# Patient Record
Sex: MALE | Race: WHITE
[De-identification: names, ages, dates, MRNs, and addresses within clinical notes are randomized per-mention and may not be internally consistent; named-entity substitution may affect disease eponyms.]

---

## 2020-08-28 ENCOUNTER — HOSPITAL ENCOUNTER (EMERGENCY)
Dept: HOSPITAL 47 - EC | Age: 60
Discharge: HOME | End: 2020-08-28
Payer: COMMERCIAL

## 2020-08-28 VITALS
HEART RATE: 111 BPM | TEMPERATURE: 98.4 F | SYSTOLIC BLOOD PRESSURE: 133 MMHG | DIASTOLIC BLOOD PRESSURE: 90 MMHG | RESPIRATION RATE: 18 BRPM

## 2020-08-28 DIAGNOSIS — L03.116: Primary | ICD-10-CM

## 2020-08-28 DIAGNOSIS — Z88.0: ICD-10-CM

## 2020-08-28 DIAGNOSIS — F17.200: ICD-10-CM

## 2020-08-28 PROCEDURE — 99283 EMERGENCY DEPT VISIT LOW MDM: CPT

## 2020-08-28 NOTE — ED
Skin/Abscess/FB HPI





- General


Chief complaint: Wound/Laceration


Stated complaint: Left leg wound


Time Seen by Provider: 08/28/20 02:23


Source: patient, RN notes reviewed, old records reviewed


Mode of arrival: ambulatory


Limitations: no limitations





- History of Present Illness


Initial comments: 





This is a 59-year-old male DF for evaluation of significant redness and pain of 

his left thigh area.  Patient is no injury noted to that area he has some 

drainage from it initial area originally, his been cleaned washed


With the redness has been spreading.  No fevers.  Patient has not been on 

antibiotics for symptoms.  No other noted areas of rash or illness


MD complaint: rash


-: days(s)


Tetanus Up to Date: unsure


Location: LLE


Severity: moderate


Severity scale (1-10): 5


Quality: burning (And itching)


Consistency: constant


Improves with: none


Worsens with: none


Context: none


Associated symptoms: denies other symptoms





- Related Data


                                  Previous Rx's











 Medication  Instructions  Recorded


 


Sulfamethox-Tmp 800-160Mg [Bactrim 2 tab PO BID #40 tab 08/28/20





-160 mg]  











                                    Allergies











Allergy/AdvReac Type Severity Reaction Status Date / Time


 


Penicillins Allergy  Rash/Hives Verified 08/28/20 02:23














Review of Systems


ROS Statement: 


Those systems with pertinent positive or pertinent negative responses have been 

documented in the HPI.





ROS Other: All systems not noted in ROS Statement are negative.





Past Medical History


Past Medical History: No Reported History


History of Any Multi-Drug Resistant Organisms: None Reported


Past Surgical History: No Surgical Hx Reported


Past Psychological History: No Psychological Hx Reported


Smoking Status: Current every day smoker


Past Alcohol Use History: None Reported


Past Drug Use History: None Reported





General Exam


Limitations: no limitations


General appearance: alert, in no apparent distress


Head exam: Present: atraumatic, normocephalic, normal inspection


Eye exam: Present: normal appearance, PERRL, EOMI.  Absent: scleral icterus, 

conjunctival injection, periorbital swelling


ENT exam: Present: normal exam, mucous membranes moist


Neck exam: Present: normal inspection.  Absent: tenderness, meningismus, lympha

denopathy


Respiratory exam: Present: normal lung sounds bilaterally.  Absent: respiratory 

distress, wheezes, rales, rhonchi, stridor


Cardiovascular Exam: Present: regular rate, normal rhythm, normal heart sounds. 

Absent: systolic murmur, diastolic murmur, rubs, gallop, clicks


GI/Abdominal exam: Present: soft, normal bowel sounds.  Absent: distended, 

tenderness, guarding, rebound, rigid


Extremities exam: Present: normal inspection, full ROM, normal capillary refill,

other (Left lower extremity a significant area of rash circular with a diameter 

of 7 cm).  Absent: tenderness, pedal edema, joint swelling, calf tenderness


Back exam: Present: normal inspection


Neurological exam: Present: alert, oriented X3, CN II-XII intact


Psychiatric exam: Present: normal affect, normal mood


Skin exam: Present: warm, dry, intact, normal color.  Absent: rash





Course


                                   Vital Signs











  08/28/20





  02:19


 


Temperature 98.4 F


 


Pulse Rate 111 H


 


Respiratory 18





Rate 


 


Blood Pressure 133/90


 


O2 Sat by Pulse 100





Oximetry 














- Reevaluation(s)


Reevaluation #1: 





Medical records reviewed





Spoke with patient need for antibiotics as well as topical antibiotics a 60 

okay for discharge will return if symptoms worsen or fever





Medical Decision Making





- Medical Decision Making





59 male DL without functionally cellulitis will trial outpatient antibiotics and

topical antibiotics return if symptoms worsen or fever





Disposition


Clinical Impression: 


 Left leg cellulitis





Disposition: HOME SELF-CARE


Condition: Good


Instructions (If sedation given, give patient instructions):  Cellulitis (ED)


Prescriptions: 


Sulfamethox-Tmp 800-160Mg [Bactrim -160 mg] 2 tab PO BID #40 tab


Is patient prescribed a controlled substance at d/c from ED?: No


Referrals: 


None,Stated [Primary Care Provider] - 1-2 days

## 2021-09-05 ENCOUNTER — HOSPITAL ENCOUNTER (INPATIENT)
Dept: HOSPITAL 47 - EC | Age: 61
LOS: 3 days | Discharge: HOME | DRG: 199 | End: 2021-09-08
Attending: INTERNAL MEDICINE | Admitting: INTERNAL MEDICINE
Payer: COMMERCIAL

## 2021-09-05 VITALS — BODY MASS INDEX: 15.7 KG/M2

## 2021-09-05 DIAGNOSIS — F12.90: ICD-10-CM

## 2021-09-05 DIAGNOSIS — J43.9: ICD-10-CM

## 2021-09-05 DIAGNOSIS — Z88.0: ICD-10-CM

## 2021-09-05 DIAGNOSIS — F17.210: ICD-10-CM

## 2021-09-05 DIAGNOSIS — Z71.6: ICD-10-CM

## 2021-09-05 DIAGNOSIS — Z20.822: ICD-10-CM

## 2021-09-05 DIAGNOSIS — E86.9: ICD-10-CM

## 2021-09-05 DIAGNOSIS — Z59.7: ICD-10-CM

## 2021-09-05 DIAGNOSIS — E86.0: ICD-10-CM

## 2021-09-05 DIAGNOSIS — J96.21: ICD-10-CM

## 2021-09-05 DIAGNOSIS — E87.2: ICD-10-CM

## 2021-09-05 DIAGNOSIS — J93.11: Primary | ICD-10-CM

## 2021-09-05 LAB
ALBUMIN SERPL-MCNC: 4.4 G/DL (ref 3.5–5)
ALP SERPL-CCNC: 75 U/L (ref 38–126)
ALT SERPL-CCNC: 22 U/L (ref 4–49)
ANION GAP SERPL CALC-SCNC: 9 MMOL/L
APTT BLD: 23 SEC (ref 22–30)
AST SERPL-CCNC: 28 U/L (ref 17–59)
BASOPHILS # BLD AUTO: 0.1 K/UL (ref 0–0.2)
BASOPHILS NFR BLD AUTO: 1 %
BUN SERPL-SCNC: 13 MG/DL (ref 9–20)
CALCIUM SPEC-MCNC: 9.6 MG/DL (ref 8.4–10.2)
CHLORIDE SERPL-SCNC: 107 MMOL/L (ref 98–107)
CO2 SERPL-SCNC: 24 MMOL/L (ref 22–30)
EOSINOPHIL # BLD AUTO: 0.2 K/UL (ref 0–0.7)
EOSINOPHIL NFR BLD AUTO: 2 %
ERYTHROCYTE [DISTWIDTH] IN BLOOD BY AUTOMATED COUNT: 4.67 M/UL (ref 4.3–5.9)
ERYTHROCYTE [DISTWIDTH] IN BLOOD: 14 % (ref 11.5–15.5)
GLUCOSE SERPL-MCNC: 144 MG/DL (ref 74–99)
HCT VFR BLD AUTO: 47 % (ref 39–53)
HGB BLD-MCNC: 15.6 GM/DL (ref 13–17.5)
INR PPP: 1 (ref ?–1.2)
LYMPHOCYTES # SPEC AUTO: 1.5 K/UL (ref 1–4.8)
LYMPHOCYTES NFR SPEC AUTO: 15 %
MAGNESIUM SPEC-SCNC: 2 MG/DL (ref 1.6–2.3)
MCH RBC QN AUTO: 33.4 PG (ref 25–35)
MCHC RBC AUTO-ENTMCNC: 33.2 G/DL (ref 31–37)
MCV RBC AUTO: 100.6 FL (ref 80–100)
MONOCYTES # BLD AUTO: 0.5 K/UL (ref 0–1)
MONOCYTES NFR BLD AUTO: 5 %
NEUTROPHILS # BLD AUTO: 7.8 K/UL (ref 1.3–7.7)
NEUTROPHILS NFR BLD AUTO: 75 %
PLATELET # BLD AUTO: 319 K/UL (ref 150–450)
POTASSIUM SERPL-SCNC: 4.5 MMOL/L (ref 3.5–5.1)
PROT SERPL-MCNC: 7 G/DL (ref 6.3–8.2)
PT BLD: 10.3 SEC (ref 9–12)
SODIUM SERPL-SCNC: 140 MMOL/L (ref 137–145)
WBC # BLD AUTO: 10.3 K/UL (ref 3.8–10.6)

## 2021-09-05 PROCEDURE — 80053 COMPREHEN METABOLIC PANEL: CPT

## 2021-09-05 PROCEDURE — 3E0F7SF INTRODUCTION OF OTHER GAS INTO RESPIRATORY TRACT, VIA NATURAL OR ARTIFICIAL OPENING: ICD-10-PCS

## 2021-09-05 PROCEDURE — 87635 SARS-COV-2 COVID-19 AMP PRB: CPT

## 2021-09-05 PROCEDURE — 93005 ELECTROCARDIOGRAM TRACING: CPT

## 2021-09-05 PROCEDURE — 32551 INSERTION OF CHEST TUBE: CPT

## 2021-09-05 PROCEDURE — 99291 CRITICAL CARE FIRST HOUR: CPT

## 2021-09-05 PROCEDURE — 80048 BASIC METABOLIC PNL TOTAL CA: CPT

## 2021-09-05 PROCEDURE — 71250 CT THORAX DX C-: CPT

## 2021-09-05 PROCEDURE — 36415 COLL VENOUS BLD VENIPUNCTURE: CPT

## 2021-09-05 PROCEDURE — 85379 FIBRIN DEGRADATION QUANT: CPT

## 2021-09-05 PROCEDURE — 85610 PROTHROMBIN TIME: CPT

## 2021-09-05 PROCEDURE — 84145 PROCALCITONIN (PCT): CPT

## 2021-09-05 PROCEDURE — 85027 COMPLETE CBC AUTOMATED: CPT

## 2021-09-05 PROCEDURE — 83735 ASSAY OF MAGNESIUM: CPT

## 2021-09-05 PROCEDURE — 0W9930Z DRAINAGE OF RIGHT PLEURAL CAVITY WITH DRAINAGE DEVICE, PERCUTANEOUS APPROACH: ICD-10-PCS

## 2021-09-05 PROCEDURE — 71045 X-RAY EXAM CHEST 1 VIEW: CPT

## 2021-09-05 PROCEDURE — 85025 COMPLETE CBC W/AUTO DIFF WBC: CPT

## 2021-09-05 PROCEDURE — 83605 ASSAY OF LACTIC ACID: CPT

## 2021-09-05 PROCEDURE — 71046 X-RAY EXAM CHEST 2 VIEWS: CPT

## 2021-09-05 PROCEDURE — 81003 URINALYSIS AUTO W/O SCOPE: CPT

## 2021-09-05 PROCEDURE — 85730 THROMBOPLASTIN TIME PARTIAL: CPT

## 2021-09-05 PROCEDURE — 84484 ASSAY OF TROPONIN QUANT: CPT

## 2021-09-05 PROCEDURE — 83880 ASSAY OF NATRIURETIC PEPTIDE: CPT

## 2021-09-05 RX ADMIN — CEFAZOLIN SCH MLS/HR: 330 INJECTION, POWDER, FOR SOLUTION INTRAMUSCULAR; INTRAVENOUS at 20:46

## 2021-09-05 RX ADMIN — ACETAMINOPHEN PRN MG: 325 TABLET, FILM COATED ORAL at 22:09

## 2021-09-05 SDOH — ECONOMIC STABILITY - INCOME SECURITY: INSUFFICIENT SOCIAL INSURANCE AND WELFARE SUPPORT: Z59.7

## 2021-09-05 NOTE — ED
SOB HPI





- General


Source: patient, EMS, RN notes reviewed


Mode of arrival: EMS





<Doug Keane - Last Filed: 09/05/21 19:52>





<Mohinder Quintero - Last Filed: 09/05/21 20:02>





- General


Chief Complaint: Shortness of Breath


Stated Complaint: JANNY


Time Seen by Provider: 09/05/21 17:54





- History of Present Illness


Initial Comments: 





Patient is a 60-year-old male that presents to emergency department complaining 

of a one to two-week history of shortness of breath.  He notes that he is a 

long-time smoker.  He notes that he was outside he doing yardwork one when he 

came into the cool air and noticed that his symptoms started.  The injury or 

trauma to his chest or ribs.  Patient did appear to be feeling under the 

weather.  She denied any history of lung complications during issues but thinks 

that he might of developed some with the last 2 weeks.  He denied any chest pain

headache nausea vomiting diarrhea constipation fever fatigue chills. 

(Doug Keane)





- Related Data


                                Home Medications











 Medication  Instructions  Recorded  Confirmed


 


No Known Home Medications  09/05/21 09/05/21











                                    Allergies











Allergy/AdvReac Type Severity Reaction Status Date / Time


 


Penicillins Allergy  Rash/Hives Verified 09/05/21 18:55














Review of Systems


ROS Other: All systems not noted in ROS Statement are negative.





<Doug Keane - Last Filed: 09/05/21 19:52>


ROS Other: All systems not noted in ROS Statement are negative.





<Mohinder Quintero - Last Filed: 09/05/21 20:02>


ROS Statement: 


Those systems with pertinent positive or pertinent negative responses have been 

documented in the HPI.








Past Medical History


Past Medical History: No Reported History


History of Any Multi-Drug Resistant Organisms: None Reported


Past Surgical History: No Surgical Hx Reported


Past Psychological History: No Psychological Hx Reported


Smoking Status: Current every day smoker


Past Alcohol Use History: None Reported


Past Drug Use History: None Reported





<Doug Keane - Last Filed: 09/05/21 19:52>





General Exam


General appearance: alert, in no apparent distress, other (Malnourished 

underweight)


Head exam: Present: atraumatic, normocephalic, normal inspection


Eye exam: Present: normal appearance, PERRL, EOMI.  Absent: scleral icterus, 

conjunctival injection, periorbital swelling


Neck exam: Present: normal inspection


Respiratory exam: Present: decreased breath sounds (Right lower lobe right 

middle lobe).  Absent: respiratory distress, wheezes, rales, rhonchi, stridor


Cardiovascular Exam: Present: regular rate, normal rhythm, normal heart sounds. 

Absent: systolic murmur, diastolic murmur, rubs, gallop, clicks


Extremities exam: Present: normal inspection, full ROM, normal capillary refill.

 Absent: tenderness, pedal edema, joint swelling, calf tenderness


Neurological exam: Present: alert, oriented X3


Psychiatric exam: Present: normal affect, normal mood


Skin exam: Present: warm, dry, intact, normal color.  Absent: rash





<Doug Keane - Last Filed: 09/05/21 19:52>





Course





                                   Vital Signs











  09/05/21 09/05/21 09/05/21





  17:42 18:48 19:47


 


Temperature 97.6 F  


 


Pulse Rate 116 H 114 H 116 H


 


Respiratory 25 H 20 20





Rate   


 


Blood Pressure 145/94 113/92 124/93


 


O2 Sat by Pulse 97  96





Oximetry   














Procedures





- Chest Tube Insertion


Consent Obtained: written consent


Side of Procedure: right


Indication: Pneumothorax


Placed on monitor/pulse oximetry: Yes


Site Prep: Chloroprep


Local Anesthesia: Lidocaine 1%


Amount (mLs): 10


Insertion Site: Other (Midclavicular line, second intercostal space.)


Scalpel: #11


Open into Pleural Space Using: Trocar


Tube Size ([place]): Other (11-[place])


Returns: Air


Sutured in Place: No


Attached to Suction: Yes


Type of Suction: Pleuravac


Repeat X-ray Results: Lung Inflated


Patient Tolerated Procedure: well





<Mohinder Quintero - Last Filed: 09/05/21 20:02>





Medical Decision Making





- Lab Data


Result diagrams: 


                                 09/05/21 18:38





                                 09/05/21 18:38





- EKG Data


-: EKG Interpreted by Me


EKG shows normal: sinus rhythm


Rate: tachycardia





- Radiology Data


Radiology results: report reviewed, image reviewed





<Doug Keane - Last Filed: 09/05/21 19:52>





- Lab Data


Result diagrams: 


                                 09/05/21 18:38





                                 09/05/21 18:38





<Mohinder Quintero - Last Filed: 09/05/21 20:02>





- Medical Decision Making





60-year-old male complaining of shortness of breath for the last week to 2 

weeks.


Basic labs, chest x-ray ordered.


X-ray shows a pretty significant right pneumothorax with no tension.


Labs: Lactic acid 2.8, rest unremarkable.


Covid test negative.


Case discussed with Dr. Quintero. (Doug Keane)


60-year-old male significant COPD history presenting with worsening dyspnea over

the past 2 weeks.  Confirmed pneumothorax on 2 view chest x-ray without tension.

 Given the tachycardia and increased dyspnea, chest tube was placed, thoravent 

11-[place].  This was placed to suction, repeat chest x-ray showing improved 

aeration of the right long.  Patient will be admitted to Dr. Vega who is aware 

of the patient with cardio thoracic surgery on consult. (Mohinder Quintero)





- Lab Data





                                   Lab Results











  09/05/21 09/05/21 09/05/21 Range/Units





  18:38 18:38 18:38 


 


WBC  10.3    (3.8-10.6)  k/uL


 


RBC  4.67    (4.30-5.90)  m/uL


 


Hgb  15.6    (13.0-17.5)  gm/dL


 


Hct  47.0    (39.0-53.0)  %


 


MCV  100.6 H    (80.0-100.0)  fL


 


MCH  33.4    (25.0-35.0)  pg


 


MCHC  33.2    (31.0-37.0)  g/dL


 


RDW  14.0    (11.5-15.5)  %


 


Plt Count  319    (150-450)  k/uL


 


MPV  7.1    


 


Neutrophils %  75    %


 


Lymphocytes %  15    %


 


Monocytes %  5    %


 


Eosinophils %  2    %


 


Basophils %  1    %


 


Neutrophils #  7.8 H    (1.3-7.7)  k/uL


 


Lymphocytes #  1.5    (1.0-4.8)  k/uL


 


Monocytes #  0.5    (0-1.0)  k/uL


 


Eosinophils #  0.2    (0-0.7)  k/uL


 


Basophils #  0.1    (0-0.2)  k/uL


 


Macrocytosis  Slight    


 


PT   10.3   (9.0-12.0)  sec


 


INR   1.0   (<1.2)  


 


APTT   23.0   (22.0-30.0)  sec


 


D-Dimer   0.50   (<0.60)  mg/L FEU


 


Sodium    140  (137-145)  mmol/L


 


Potassium    4.5  (3.5-5.1)  mmol/L


 


Chloride    107  ()  mmol/L


 


Carbon Dioxide    24  (22-30)  mmol/L


 


Anion Gap    9  mmol/L


 


BUN    13  (9-20)  mg/dL


 


Creatinine    0.70  (0.66-1.25)  mg/dL


 


Est GFR (CKD-EPI)AfAm    >90  (>60 ml/min/1.73 sqM)  


 


Est GFR (CKD-EPI)NonAf    >90  (>60 ml/min/1.73 sqM)  


 


Glucose    144 H  (74-99)  mg/dL


 


Plasma Lactic Acid Clarence     (0.7-2.0)  mmol/L


 


Calcium    9.6  (8.4-10.2)  mg/dL


 


Magnesium    2.0  (1.6-2.3)  mg/dL


 


Total Bilirubin    0.5  (0.2-1.3)  mg/dL


 


AST    28  (17-59)  U/L


 


ALT    22  (4-49)  U/L


 


Alkaline Phosphatase    75  ()  U/L


 


Troponin I     (0.000-0.034)  ng/mL


 


NT-Pro-B Natriuret Pep     pg/mL


 


Total Protein    7.0  (6.3-8.2)  g/dL


 


Albumin    4.4  (3.5-5.0)  g/dL


 


Coronavirus (PCR)     (Not Detectd)  














  09/05/21 09/05/21 09/05/21 Range/Units





  18:38 18:38 18:38 


 


WBC     (3.8-10.6)  k/uL


 


RBC     (4.30-5.90)  m/uL


 


Hgb     (13.0-17.5)  gm/dL


 


Hct     (39.0-53.0)  %


 


MCV     (80.0-100.0)  fL


 


MCH     (25.0-35.0)  pg


 


MCHC     (31.0-37.0)  g/dL


 


RDW     (11.5-15.5)  %


 


Plt Count     (150-450)  k/uL


 


MPV     


 


Neutrophils %     %


 


Lymphocytes %     %


 


Monocytes %     %


 


Eosinophils %     %


 


Basophils %     %


 


Neutrophils #     (1.3-7.7)  k/uL


 


Lymphocytes #     (1.0-4.8)  k/uL


 


Monocytes #     (0-1.0)  k/uL


 


Eosinophils #     (0-0.7)  k/uL


 


Basophils #     (0-0.2)  k/uL


 


Macrocytosis     


 


PT     (9.0-12.0)  sec


 


INR     (<1.2)  


 


APTT     (22.0-30.0)  sec


 


D-Dimer     (<0.60)  mg/L FEU


 


Sodium     (137-145)  mmol/L


 


Potassium     (3.5-5.1)  mmol/L


 


Chloride     ()  mmol/L


 


Carbon Dioxide     (22-30)  mmol/L


 


Anion Gap     mmol/L


 


BUN     (9-20)  mg/dL


 


Creatinine     (0.66-1.25)  mg/dL


 


Est GFR (CKD-EPI)AfAm     (>60 ml/min/1.73 sqM)  


 


Est GFR (CKD-EPI)NonAf     (>60 ml/min/1.73 sqM)  


 


Glucose     (74-99)  mg/dL


 


Plasma Lactic Acid Clarence  2.8 H*    (0.7-2.0)  mmol/L


 


Calcium     (8.4-10.2)  mg/dL


 


Magnesium     (1.6-2.3)  mg/dL


 


Total Bilirubin     (0.2-1.3)  mg/dL


 


AST     (17-59)  U/L


 


ALT     (4-49)  U/L


 


Alkaline Phosphatase     ()  U/L


 


Troponin I   <0.012   (0.000-0.034)  ng/mL


 


NT-Pro-B Natriuret Pep    29  pg/mL


 


Total Protein     (6.3-8.2)  g/dL


 


Albumin     (3.5-5.0)  g/dL


 


Coronavirus (PCR)     (Not Detectd)  














  09/05/21 Range/Units





  18:38 


 


WBC   (3.8-10.6)  k/uL


 


RBC   (4.30-5.90)  m/uL


 


Hgb   (13.0-17.5)  gm/dL


 


Hct   (39.0-53.0)  %


 


MCV   (80.0-100.0)  fL


 


MCH   (25.0-35.0)  pg


 


MCHC   (31.0-37.0)  g/dL


 


RDW   (11.5-15.5)  %


 


Plt Count   (150-450)  k/uL


 


MPV   


 


Neutrophils %   %


 


Lymphocytes %   %


 


Monocytes %   %


 


Eosinophils %   %


 


Basophils %   %


 


Neutrophils #   (1.3-7.7)  k/uL


 


Lymphocytes #   (1.0-4.8)  k/uL


 


Monocytes #   (0-1.0)  k/uL


 


Eosinophils #   (0-0.7)  k/uL


 


Basophils #   (0-0.2)  k/uL


 


Macrocytosis   


 


PT   (9.0-12.0)  sec


 


INR   (<1.2)  


 


APTT   (22.0-30.0)  sec


 


D-Dimer   (<0.60)  mg/L FEU


 


Sodium   (137-145)  mmol/L


 


Potassium   (3.5-5.1)  mmol/L


 


Chloride   ()  mmol/L


 


Carbon Dioxide   (22-30)  mmol/L


 


Anion Gap   mmol/L


 


BUN   (9-20)  mg/dL


 


Creatinine   (0.66-1.25)  mg/dL


 


Est GFR (CKD-EPI)AfAm   (>60 ml/min/1.73 sqM)  


 


Est GFR (CKD-EPI)NonAf   (>60 ml/min/1.73 sqM)  


 


Glucose   (74-99)  mg/dL


 


Plasma Lactic Acid Clarence   (0.7-2.0)  mmol/L


 


Calcium   (8.4-10.2)  mg/dL


 


Magnesium   (1.6-2.3)  mg/dL


 


Total Bilirubin   (0.2-1.3)  mg/dL


 


AST   (17-59)  U/L


 


ALT   (4-49)  U/L


 


Alkaline Phosphatase   ()  U/L


 


Troponin I   (0.000-0.034)  ng/mL


 


NT-Pro-B Natriuret Pep   pg/mL


 


Total Protein   (6.3-8.2)  g/dL


 


Albumin   (3.5-5.0)  g/dL


 


Coronavirus (PCR)  Not Detected  (Not Detectd)  














- EKG Data


EKG Comments: 





Ventricular rate 112 bpm, IA interval 140 ms, QRS duration 84 ms,  ms, 

PRT axes 90/10/80.  Sinus tachycardia, right atrial enlargement, rightward 

axis, pulmonary disease pattern, abnormal ECG. (Doug Keane)





- Radiology Data





Chest x-ray: Large right-sided pneumothorax without evidence of significant 

tension.  COPD.  This exam was discussed with Sandeep Keane at 7 PM. 

(Doug Keane)





Critical Care Time


Critical Care Time: Yes


Total Critical Care Time: 35





<Mohinder Quintero - Last Filed: 09/05/21 20:02>





Disposition





<Doug Keane - Last Filed: 09/05/21 19:52>


Is patient prescribed a controlled substance at d/c from ED?: No


Decision to Admit Reason: Admit from EC


Decision Date: 09/05/21


Decision Time: 20:02





<Mohinder Quintero - Last Filed: 09/05/21 20:02>


Clinical Impression: 


 Pneumothorax





Disposition: ADMITTED AS IP TO THIS [facility]


Condition: Stable


Referrals: 


None,Stated [Primary Care Provider] - 1-2 days

## 2021-09-05 NOTE — XR
EXAMINATION TYPE: XR chest 1V portable

 

DATE OF EXAM: 9/5/2021

 

COMPARISON: Today

 

HISTORY: Chest

 

TECHNIQUE: Single view

 

FINDINGS: There is a right-sided chest tube that appears in good position. There is complete clearing
 of the right side pneumothorax. Trachea is midline. There is some interstitial infiltrate and atelec
tasis in the lower lung fields bilaterally. There are chest leads.

 

IMPRESSION: Mild atelectasis in the lower lung fields. Complete clearing of the pneumothorax. There i
s COPD.

## 2021-09-05 NOTE — XR
EXAMINATION TYPE: XR chest 2V

 

DATE OF EXAM: 9/5/2021

 

COMPARISON: NONE

 

HISTORY: Short of breath

 

TECHNIQUE: 4 views

 

FINDINGS: There is large right-sided pneumothorax more than 75%. There is no significant shift of the
 mediastinum. Left lung is clear. There is pulmonary hyperinflation and flattening of the diaphragm.

 

IMPRESSION: Large right-sided pneumothorax without evidence of significant tension. COPD. This exam w
as discussed with Doug Keane at 7:00 PM.

## 2021-09-06 LAB
PH UR: 6 [PH] (ref 5–8)
SP GR UR: 1.02 (ref 1–1.03)
UROBILINOGEN UR QL STRIP: <2 MG/DL (ref ?–2)

## 2021-09-06 RX ADMIN — LEVOFLOXACIN SCH MLS/HR: 5 INJECTION, SOLUTION INTRAVENOUS at 16:16

## 2021-09-06 RX ADMIN — HEPARIN SODIUM SCH UNIT: 5000 INJECTION INTRAVENOUS; SUBCUTANEOUS at 16:17

## 2021-09-06 RX ADMIN — HEPARIN SODIUM SCH UNIT: 5000 INJECTION INTRAVENOUS; SUBCUTANEOUS at 22:53

## 2021-09-06 RX ADMIN — FAMOTIDINE SCH MG: 10 INJECTION, SOLUTION INTRAVENOUS at 20:08

## 2021-09-06 RX ADMIN — HYDROMORPHONE HYDROCHLORIDE PRN MG: 1 INJECTION, SOLUTION INTRAMUSCULAR; INTRAVENOUS; SUBCUTANEOUS at 21:02

## 2021-09-06 RX ADMIN — HYDROMORPHONE HYDROCHLORIDE PRN MG: 1 INJECTION, SOLUTION INTRAMUSCULAR; INTRAVENOUS; SUBCUTANEOUS at 05:33

## 2021-09-06 RX ADMIN — CEFAZOLIN SCH MLS/HR: 330 INJECTION, POWDER, FOR SOLUTION INTRAMUSCULAR; INTRAVENOUS at 05:18

## 2021-09-06 RX ADMIN — HYDROMORPHONE HYDROCHLORIDE PRN MG: 1 INJECTION, SOLUTION INTRAMUSCULAR; INTRAVENOUS; SUBCUTANEOUS at 14:55

## 2021-09-06 NOTE — P.GSCN
<Jasmine Soto - Last Filed: 21 09:36>





History of Present Illness


Consult date: 21


Reason for Consult: 





Right-sided spontaneous pneumothorax


Requesting physician: Mohinder Quintero


History of present illness: 





This is a 60-year-old gentleman who does not follow on a regular basis with a 

primary care physician.  He has no reported previous medical history except 

current tobacco dependence, COPD, occasional marijuana use, and family history 

of premature coronary artery disease with father  at 59 from myocardial 

infarction.  Approximately 10 days ago he had been out in the yard gardening 

when he came into the air conditioned house, the cold air hit him and he felt 

very short of breath.  He reports using his wife's nebulizer with improvement in

his breathing.  Over the last 10 days he has continued to have episodes of 

shortness of breath mostly relieved with his wife's breathing treatments, 

however yesterday afternoon his dyspnea was no longer controlled and he felt 

like his heart was racing so he presented to [facility] emergency room 

for evaluation and treatment.  On chest x-ray he was noted to have a large 

right-sided pneumothorax without evidence of tension.  A thoravent was placed by

the emergency room physicians and connected to wall suction with good re-

expansion of the right lung.  The patient immediately felt improvement in his 

breathing.  He reports never having pneumothorax previously.  The patient was 

admitted for continued management of pneumothorax with consultation placed to 

cardiothoracic surgery for recommendations.





Review of Systems





Review of systems was completed and was negative except as noted





- Respiratory


Reports as per HPI, Reports cough, Reports dyspnea





Past Medical History


Past Medical History: COPD


History of Any Multi-Drug Resistant Organisms: None Reported


Past Surgical History: No Surgical Hx Reported


Past Anesthesia/Blood Transfusion Reactions: No Reported Reaction


Past Psychological History: No Psychological Hx Reported


Smoking Status: Current every day smoker


Past Alcohol Use History: None Reported


Past Drug Use History: Marijuana


Additional Drug Use History / Comment(s): Occasional marijuana


Additional History: Reportedly has smoked approximately 1 pack per day for the 

last 50 years





- Past Family History


  ** Father


Family Medical History: Myocardial Infarction (MI)


Additional Family Medical History / Comment(s): Father  of myocardial 

infarction at 59 years old





  ** Mother


Family Medical History: COPD


Additional Family Medical History / Comment(s): Mother  of emphysema





Medications and Allergies


                                Home Medications











 Medication  Instructions  Recorded  Confirmed  Type


 


No Known Home Medications  21 History








                                    Allergies











Allergy/AdvReac Type Severity Reaction Status Date / Time


 


Penicillins Allergy  Rash/Hives Verified 21 18:55














Surgical - Exam


                                   Vital Signs











Temp Pulse Resp BP Pulse Ox


 


 97.6 F   116 H  25 H  145/94   97 


 


 21 17:42  21 17:42  21 17:42  21 17:42  21 17:42














CONSTITUTIONAL: Awake and alert, appears comfortable, cooperative, cachectic, no

pain, no acute distress


EYES:  Pupils equal, round, reactive to light, normal ocular movement


ENT:  Moist mucous membranes without oral lesions present 


NECK:  No masses, no bruits, trachea midline


RESPIRATORY:  Lungs sounds diminished bilaterally.  Respirations even, 

nonlabored.  Currently on 2 L nasal cannula with oxygen saturation 100%.  Strong

nonproductive cough.  Able to achieve 3000 mL on his incentive spirometry.  

Right-sided thoravent present and connected to continuous wall suction, no air 

leak currently present 


CARDIOVASCULAR:  S1, S2 present.  Regular rate and rhythm, sinus rhythm on 

telemetry.  Palpable peripheral pulses bilaterally.  No edema present.  No calf 

pain or tenderness noted.  


GASTROINTESTINAL:  Abdomen soft, nontender, nondistended without masses or 

organomegaly noted.  There is no rebound or guarding present.  Active bowel 

sounds present 4 quadrants.  


GENITOURINARY:  Deferred


INTEGUMENTARY:  Skin is warm and dry with evidence of good perfusion.  


NEUROLOGIC:  Cranial nerves II through XII intact, normal coordination, no 

obvious motor or sensory deficits, speech is normal


MUSKULOSKELETAL:  Able to move all extremities, strength equal bilaterally, 

normal posture


PSYCHIATRIC:  Alert and oriented to person place and time, appropriate affect, 

intact judgment and insight














Results





- Labs





                                 21 18:38





                                 21 18:38


                  Abnormal Lab Results - Last 24 Hours (Table)











  21 Range/Units





  18:38 18:38 18:38 


 


MCV  100.6 H    (80.0-100.0)  fL


 


Neutrophils #  7.8 H    (1.3-7.7)  k/uL


 


Glucose   144 H   (74-99)  mg/dL


 


Plasma Lactic Acid Clarence    2.8 H*  (0.7-2.0)  mmol/L


 


Urine Glucose (UA)     (Negative)  














  21 Range/Units





  21:07 00:03 05:20 


 


MCV     (80.0-100.0)  fL


 


Neutrophils #     (1.3-7.7)  k/uL


 


Glucose     (74-99)  mg/dL


 


Plasma Lactic Acid Clarence  4.2 H*  3.7 H*   (0.7-2.0)  mmol/L


 


Urine Glucose (UA)    Trace H  (Negative)  








                                 Diabetes panel











  21 Range/Units





  18:38 


 


Sodium  140  (137-145)  mmol/L


 


Potassium  4.5  (3.5-5.1)  mmol/L


 


Chloride  107  ()  mmol/L


 


Carbon Dioxide  24  (22-30)  mmol/L


 


BUN  13  (9-20)  mg/dL


 


Creatinine  0.70  (0.66-1.25)  mg/dL


 


Glucose  144 H  (74-99)  mg/dL


 


Calcium  9.6  (8.4-10.2)  mg/dL


 


AST  28  (17-59)  U/L


 


ALT  22  (4-49)  U/L


 


Alkaline Phosphatase  75  ()  U/L


 


Total Protein  7.0  (6.3-8.2)  g/dL


 


Albumin  4.4  (3.5-5.0)  g/dL








                                  Calcium panel











  21 Range/Units





  18:38 


 


Calcium  9.6  (8.4-10.2)  mg/dL


 


Albumin  4.4  (3.5-5.0)  g/dL








                                 Pituitary panel











  21 Range/Units





  18:38 


 


Sodium  140  (137-145)  mmol/L


 


Potassium  4.5  (3.5-5.1)  mmol/L


 


Chloride  107  ()  mmol/L


 


Carbon Dioxide  24  (22-30)  mmol/L


 


BUN  13  (9-20)  mg/dL


 


Creatinine  0.70  (0.66-1.25)  mg/dL


 


Glucose  144 H  (74-99)  mg/dL


 


Calcium  9.6  (8.4-10.2)  mg/dL








                                  Adrenal panel











  21 Range/Units





  18:38 


 


Sodium  140  (137-145)  mmol/L


 


Potassium  4.5  (3.5-5.1)  mmol/L


 


Chloride  107  ()  mmol/L


 


Carbon Dioxide  24  (22-30)  mmol/L


 


BUN  13  (9-20)  mg/dL


 


Creatinine  0.70  (0.66-1.25)  mg/dL


 


Glucose  144 H  (74-99)  mg/dL


 


Calcium  9.6  (8.4-10.2)  mg/dL


 


Total Bilirubin  0.5  (0.2-1.3)  mg/dL


 


AST  28  (17-59)  U/L


 


ALT  22  (4-49)  U/L


 


Alkaline Phosphatase  75  ()  U/L


 


Total Protein  7.0  (6.3-8.2)  g/dL


 


Albumin  4.4  (3.5-5.0)  g/dL














- Imaging


Chest x-ray: report reviewed, image reviewed


EKG: image reviewed





Assessment and Plan


Assessment: 





1.  Spontaneous right-sided pneumothorax, first occurrence, status post 

placement of a thoravent by emergency room physicians


2.  Shortness of breath, secondary to above


3.  Lactic acidosis present on admission, resolved


4.  Current long-term tobacco dependence


5.  COPD


6.  Occasional marijuana use


7.  Family history of premature coronary artery disease


Plan: 





The patient was seen and examined at the bedside.  Chart/diagnostics were 

reviewed.  The case was discussed in detail with Dr. Friedman.  At this time we 

will keep his thoravent connected to continuous wall suction.  If there is no 

air leak present in the morning we will place him to waterseal and obtain a 

chest CT without contrast.  Encourage incentive spirometry use.  Wean O2 as 

tolerated.  Increase activity, may take off suction for short periods of time to

ambulate in the hallway.  Smoking cessation counseling offered.  Medical 

management of other comorbidities per primary care service.  More 

recommendations to follow.





Thank you for this consult.  We will follow along with you.


Time with Patient: Greater than 30





<Lorenzo Friedman - Last Filed: 21 21:14>





Surgical - Exam


                                   Vital Signs











Temp Pulse Resp BP Pulse Ox


 


 97.6 F   116 H  25 H  145/94   97 


 


 21 17:42  21 17:42  21 17:42  21 17:42  21 17:42














Results





- Labs





                                 21 08:22





                                 21 18:38


                  Abnormal Lab Results - Last 24 Hours (Table)











  21 Range/Units





  08:22 


 


WBC  13.3 H  (3.8-10.6)  k/uL


 


RBC  4.25 L  (4.30-5.90)  m/uL


 


MCV  102.4 H  (80.0-100.0)  fL


 


Neutrophils #  10.1 H  (1.3-7.7)  k/uL














Assessment and Plan


Plan: 


The patient is a 60 year old male who presented to the ED with acute shortness 

of breath. Workup revealed first episode of spontaneous pneumothorax. A 

thoravent was placed with re-expansion of lung. Continue thoravent to suction 

for now. Recommend CT scan of the chest to determine presence of blebs/bullae. 

No plan for surgical intervention at this time. We will follow along with you.

## 2021-09-06 NOTE — P.HPIM
History of Present Illness





This is a pleasant 60 years old male with no significant past medical history.  

Presents because of shortness of breath for 10 days duration with no significant

cough but he is spitting white to yellow Twice today.  Right-sided chest pain 

when he came in but today's chest pain is improved.


His dyspnea was much worse yesterday so he decided to come to emergency room.


He denies Diarrhea or dysuria or fever.


No headache or weakness


He smokes about 12 cigarettes to 1 pack per day, he is counseled to quit and he 

agrees but he declined nicotine patch for now.  Or illicit drugs.


He does not have PCP.  He is not on Home oxygen at baseline





Vital signs stable, currently he saturated 100% on 2 L oxygen via nasal Cannula


Labs reviewed, CBC is unremarkable.


INR is normal at 1.0.  D-dimer is negative at 0.5.


Lactic acid elevated at 4.2 came back to normal at 1.5


Rest of BMP and liver enzymes are unremarkable.  ProBNP is 29.  And is negative 

less than 0.012.


Urine analysis is unremarkable.  Covid test is negative.


Chest x-ray showed a large right-sided pneumothorax without tension.  COPD.  

Repeat chest x-ray this morning showing increasing right lower lobe and left 

perihilar infiltrates


He is currently on normal saline at 100 mL per hour.  Management


Cardiothoracic surgery with lens been consulted to the emergency room 


repeat chest x-ray today showing worsened right lower lobe infiltrate

















Review of Systems





CONSTITUTIONAL: No fever, no malaise, no fatigue. 


HEENT: No recent visual problems or hearing problems. Denied any sore throat. 


CARDIOVASCULAR: No  orthopnea, PND, no palpitations, no syncope. 


PULMONARY: No shortness of breath, no cough, no hemoptysis. 


GASTROINTESTINAL: No diarrhea, no nausea, no vomiting, no abdominal pain. 

Normoactive bowel sounds. 


NEUROLOGICAL: No headaches, no weakness, no numbness. 


HEMATOLOGICAL: Denies any bleeding or petechiae. 


GENITOURINARY: Denies any burning micturition, frequency, or urgency. 


MUSCULOSKELETAL/RHEUMATOLOGICAL: Denies any joint pain, swelling, or any muscle 

pain. 


ENDOCRINE: Denies any polyuria or polydipsia.











Past Medical History


Past Medical History: No Reported History


History of Any Multi-Drug Resistant Organisms: None Reported


Past Surgical History: No Surgical Hx Reported


Past Anesthesia/Blood Transfusion Reactions: No Reported Reaction


Past Psychological History: No Psychological Hx Reported


Smoking Status: Current every day smoker


Past Alcohol Use History: None Reported


Past Drug Use History: None Reported





- Past Family History


  ** Father


Family Medical History: Myocardial Infarction (MI)


Additional Family Medical History / Comment(s): Father  of myocardial 

infarction at 59 years old





  ** Mother


Family Medical History: COPD


Additional Family Medical History / Comment(s): Mother  of emphysema





Medications and Allergies


                                Home Medications











 Medication  Instructions  Recorded  Confirmed  Type


 


No Known Home Medications  21 History








                                    Allergies











Allergy/AdvReac Type Severity Reaction Status Date / Time


 


Penicillins Allergy  Rash/Hives Verified 21 18:55














Physical Exam


Vitals: 


                                   Vital Signs











  Temp Pulse Pulse Resp BP BP Pulse Ox


 


 21 04:00  98.1 F   66  20   116/70  100


 


 21 00:00  98.0 F   95  20   124/68  96


 


 21 21:30  98.3 F   84  24   121/66  97


 


 21 20:41  97.9 F  101 H   18  110/75   100


 


 21 19:47   116 H   20  124/93   96


 


 21 18:48   114 H   20  113/92  


 


 21 17:42  97.6 F  116 H   25 H  145/94   97








                                Intake and Output











 21





 22:59 06:59 14:59


 


Intake Total 10  


 


Output Total  625 


 


Balance 10 -625 


 


Intake:   


 


  IV 10  


 


    Invasive Line 1 10  


 


Output:   


 


  Urine  625 


 


Other:   


 


  Voiding Method Urinal Urinal 


 


  Weight 63.503 kg 57.4 kg 














GENERAL: The patient is alert and oriented x3, not in any acute distress. Well 

developed, well nourished. 


HEENT: Pupils are round and equally reacting to light. EOMI. No scleral icterus.

No conjunctival pallor. Normocephalic, atraumatic. No pharyngeal erythema. No 

thyromegaly. 


CARDIOVASCULAR: S1 and S2 present. No murmurs, rubs, or gallops. 


PULMONARY: Chest is clear to auscultation, no wheezing or crackles. 


ABDOMEN: Soft, nontender, nondistended, normoactive bowel sounds. No palpable 

organomegaly. 


MUSCULOSKELETAL: No joint swelling or deformity. 


EXTREMITIES: No cyanosis, clubbing, or pedal edema. 


NEUROLOGICAL: Gross neurological examination did not reveal any focal deficits. 


SKIN: No rashes. No petechiae








Results


CBC & Chem 7: 


                                 21 18:38





                                 21 18:38


Labs: 


                  Abnormal Lab Results - Last 24 Hours (Table)











  21 Range/Units





  18:38 18:38 18:38 


 


MCV  100.6 H    (80.0-100.0)  fL


 


Neutrophils #  7.8 H    (1.3-7.7)  k/uL


 


Glucose   144 H   (74-99)  mg/dL


 


Plasma Lactic Acid Clarence    2.8 H*  (0.7-2.0)  mmol/L


 


Urine Glucose (UA)     (Negative)  














  21 Range/Units





  21:07 00:03 05:20 


 


MCV     (80.0-100.0)  fL


 


Neutrophils #     (1.3-7.7)  k/uL


 


Glucose     (74-99)  mg/dL


 


Plasma Lactic Acid Clarence  4.2 H*  3.7 H*   (0.7-2.0)  mmol/L


 


Urine Glucose (UA)    Trace H  (Negative)  














Assessment and Plan


Assessment: 





Acute  right pneumothorax, status post thoravent 


Right lower lobe pneumonia


Patient with elevated lactic acid came back to normal 


Increasing right lower lobe and left perihilar infiltrate 











Plan: 





this is a pleasant 60 years old male who presents with a right pneumothorax


Cardiothoracic surgery consult. 


Pain management 


Start patient on Levaquin


Labs and medication were reviewed..  Continue same treatment.  Continue with 

symptomatic treatment.  Resume home medication.  Monitor lytes and vitals.  DVT 

and GI prophylaxis.  Further recommendations depends on the clinical course of 

the patient


DVT prophylaxis: Subcutaneous heparin


GI Prophylaxis: Pepcid


PT/OT: Pending


Prognosis is guarded

## 2021-09-06 NOTE — XR
EXAMINATION TYPE: XR chest 1V portable

 

DATE OF EXAM: 9/6/2021

 

COMPARISON: 9/5/2021

 

INDICATION: Right-sided pneumothorax

 

TECHNIQUE: Single frontal view of the chest is obtained.

 

FINDINGS:  

The heart size is normal.  

The pulmonary vasculature is normal.  

Mild right lower lobe infiltrate is present. Some left perihilar infiltrate may be present. Infiltrat
e is developed the right costophrenic angle.  

There is some minimal increase in the right pneumothorax less than 5%.

 

IMPRESSION:  

1. Increasing right lower lobe, right costophrenic angle, left perihilar infiltrates.

2. Very minimal increase in a tiny pneumothorax.

## 2021-09-07 LAB
BASOPHILS # BLD AUTO: 0 K/UL (ref 0–0.2)
BASOPHILS NFR BLD AUTO: 0 %
EOSINOPHIL # BLD AUTO: 0.2 K/UL (ref 0–0.7)
EOSINOPHIL NFR BLD AUTO: 1 %
ERYTHROCYTE [DISTWIDTH] IN BLOOD BY AUTOMATED COUNT: 4.25 M/UL (ref 4.3–5.9)
ERYTHROCYTE [DISTWIDTH] IN BLOOD: 14.1 % (ref 11.5–15.5)
HCT VFR BLD AUTO: 43.5 % (ref 39–53)
HGB BLD-MCNC: 14.1 GM/DL (ref 13–17.5)
LYMPHOCYTES # SPEC AUTO: 2.3 K/UL (ref 1–4.8)
LYMPHOCYTES NFR SPEC AUTO: 18 %
MCH RBC QN AUTO: 33.1 PG (ref 25–35)
MCHC RBC AUTO-ENTMCNC: 32.3 G/DL (ref 31–37)
MCV RBC AUTO: 102.4 FL (ref 80–100)
MONOCYTES # BLD AUTO: 0.5 K/UL (ref 0–1)
MONOCYTES NFR BLD AUTO: 4 %
NEUTROPHILS # BLD AUTO: 10.1 K/UL (ref 1.3–7.7)
NEUTROPHILS NFR BLD AUTO: 76 %
PLATELET # BLD AUTO: 296 K/UL (ref 150–450)
WBC # BLD AUTO: 13.3 K/UL (ref 3.8–10.6)

## 2021-09-07 RX ADMIN — LEVOFLOXACIN SCH MLS/HR: 5 INJECTION, SOLUTION INTRAVENOUS at 15:34

## 2021-09-07 RX ADMIN — KETOROLAC TROMETHAMINE PRN MG: 15 INJECTION, SOLUTION INTRAMUSCULAR; INTRAVENOUS at 07:56

## 2021-09-07 RX ADMIN — FAMOTIDINE SCH MG: 10 INJECTION, SOLUTION INTRAVENOUS at 19:40

## 2021-09-07 RX ADMIN — ACETAMINOPHEN PRN MG: 325 TABLET, FILM COATED ORAL at 15:34

## 2021-09-07 RX ADMIN — HEPARIN SODIUM SCH UNIT: 5000 INJECTION INTRAVENOUS; SUBCUTANEOUS at 19:40

## 2021-09-07 RX ADMIN — CEFAZOLIN SCH MLS/HR: 330 INJECTION, POWDER, FOR SOLUTION INTRAMUSCULAR; INTRAVENOUS at 19:40

## 2021-09-07 RX ADMIN — FAMOTIDINE SCH MG: 10 INJECTION, SOLUTION INTRAVENOUS at 07:57

## 2021-09-07 RX ADMIN — HEPARIN SODIUM SCH UNIT: 5000 INJECTION INTRAVENOUS; SUBCUTANEOUS at 07:57

## 2021-09-07 NOTE — P.CNPUL
History of Present Illness


Consult date: 21


Reason for consult: dyspnea, cough, chest pain, COPD, hypoxemia


Chief complaint: Shortness of breath for about a week to 10 days duration


History of present illness: 





This is a 60-year-old male with extensive history of smoking and nicotine use 

and smokes about one to 2 packs per day for about 30-40 years came into the 

hospital with progressive increasing shortness of breath started about a week 

ago his symptoms got better with nebulization treatment however due to 

persistent shortness of breath came into the hospital for further evaluation, 

patient underwent a chest x-ray on admit significant large right-sided pn

eumothorax was seen without evidence of tension, forever and was placed in the 

emergency room by emergency room physician with good expansion of the right 

lung, computed tomography scan performed today shows extensive bilateral 

emphysematous changes with tiny effusion at the bases, some atelectasis seen, 

some scarring also noted, tiny residual right pneumothorax less than 5% noted, 

in addition at [facility] right-sided apical bulla was noted as well, patient 

shortness of breath has significantly improved, chest pain is better, able to 

ambulate currently on 3 L oxygen, patient is doing incentive spirometry





Review of Systems


All systems: negative





Past Medical History


Past Medical History: No Reported History


History of Any Multi-Drug Resistant Organisms: None Reported


Past Surgical History: No Surgical Hx Reported


Past Anesthesia/Blood Transfusion Reactions: No Reported Reaction


Past Psychological History: No Psychological Hx Reported


Smoking Status: Current every day smoker


Past Alcohol Use History: None Reported


Past Drug Use History: None Reported





- Past Family History


  ** Father


Family Medical History: Myocardial Infarction (MI)


Additional Family Medical History / Comment(s): Father  of myocardial 

infarction at 59 years old





  ** Mother


Family Medical History: COPD


Additional Family Medical History / Comment(s): Mother  of emphysema





Medications and Allergies


                                Home Medications











 Medication  Instructions  Recorded  Confirmed  Type


 


No Known Home Medications  21 History








                                    Allergies











Allergy/AdvReac Type Severity Reaction Status Date / Time


 


Penicillins Allergy  Rash/Hives Verified 21 18:55














Physical Exam


Vitals: 


                                   Vital Signs











  Temp Pulse Resp BP Pulse Ox


 


 21 11:19  97.6 F  64  16  117/64  93 L


 


 21 08:01  97.7 F  67  16  124/69  99


 


 21 04:00  97.6 F  51 L  18  109/66  100


 


 21 00:00  97.5 F L  59 L  18  116/69  99


 


 21 20:00  98.2 F  72  20  112/65  97


 


 21 16:00  97.6 F  71  20  127/63  100








                                Intake and Output











 21





 06:59 14:59 22:59


 


Intake Total  240 


 


Output Total 1000 900 


 


Balance -1000 -660 


 


Intake:   


 


  Oral  240 


 


Output:   


 


  Urine 1000 900 


 


Other:   


 


  # Voids 1  


 


  Weight 57 kg  














- Constitutional


General appearance: average body habitus, cooperative, disheveled





- EENT


Eyes: PERRLA


Ears: bilateral: normal





- Neck


Carotids: bilateral: upstroke normal


Thyroid: bilateral: normal size





- Respiratory


Respiratory: bilateral: diminished





- Cardiovascular


Rhythm: regular


Heart sounds: normal: S1, S2





- Gastrointestinal


General gastrointestinal: soft





- Integumentary


Integumentary: normal turgor





- Neurologic


Neurologic: CNII-XII intact





- Musculoskeletal


Musculoskeletal: gait normal, generalized weakness, strength equal bilaterally





- Psychiatric


Psychiatric: A&O x's 3, appropriate affect, intact judgment & insight





Results





- Laboratory Findings


CBC and BMP: 


                                 21 08:22





                                 21 18:38


PT/INR, D-dimer











PT  10.3 sec (9.0-12.0)   21  18:38    


 


INR  1.0  (<1.2)   21  18:38    


 


D-Dimer  0.50 mg/L FEU (<0.60)   21  18:38    








Abnormal lab findings: 


                                  Abnormal Labs











  21





  18:38 18:38 18:38


 


WBC   


 


RBC   


 


MCV  100.6 H  


 


Neutrophils #  7.8 H  


 


Glucose   144 H 


 


Plasma Lactic Acid Clarence    2.8 H*


 


Urine Glucose (UA)   














  21





  21:07 00:03 05:20


 


WBC   


 


RBC   


 


MCV   


 


Neutrophils #   


 


Glucose   


 


Plasma Lactic Acid Clarence  4.2 H*  3.7 H* 


 


Urine Glucose (UA)    Trace H














  21





  08:22


 


WBC  13.3 H


 


RBC  4.25 L


 


MCV  102.4 H


 


Neutrophils #  10.1 H


 


Glucose 


 


Plasma Lactic Acid Clarence 


 


Urine Glucose (UA) 














- Diagnostic Findings


Chest x-ray: report reviewed, image reviewed


CT scan - chest: report reviewed, image reviewed (Finding as noted above)





Assessment and Plan


Assessment: 





Right-sided spontaneous pneumothorax





Acute on chronic hypoxic respiratory failure





End-stage severe COPD





Extensive bullous disease of the lung





Dehydration and intravascular volume depletion improved





Extensive history of smoking and nicotine use


Plan: 





Continue deep breathing exercise incentive spirometry





Continue bronchodilator





Patient has been consult about smoking cessation





Further workup and evaluation of COPD as outpatient including alpha-1 

antitrypsin level





Leave Thoravent in for now





Follow clinical course closely further recommendations pending


Time with Patient: Greater than 30

## 2021-09-07 NOTE — P.PN
Subjective


Progress Note Date: 09/07/21


Principal diagnosis: 





Spontaneous right-sided pneumothorax, first occurrence, status post placement of

a thoravent by emergency room physicians, shortness of breath, lactic acidosis 

present on admission.  Medical history of current long-term tobacco dependence, 

COPD, occasional marijuana use, family history of premature coronary artery 

disease





The patient's currently sitting up in bed in no acute distress.  Denies pain or 

shortness of breath.  States he has been coughing up a lot of yellow sputum.  

Thoravent was placed to waterseal this morning, CT was completed demonstrating 

trivial 5% remaining apical pneumothorax on the right side, patient does have 

emphysematous changes to the lungs.  Atrium were removed, non-occlusive cap 

placed to thoravent.  Remains on 3 L nasal cannula although oxygen saturation 

has remained in the high 90s to 100%.  No other new concerns.





Objective





- Vital Signs


Vital signs: 


                                   Vital Signs











Temp  97.6 F   09/07/21 04:00


 


Pulse  51 L  09/07/21 04:00


 


Resp  18   09/07/21 04:00


 


BP  109/66   09/07/21 04:00


 


Pulse Ox  100   09/07/21 04:00








                                 Intake & Output











 09/06/21 09/07/21 09/07/21





 18:59 06:59 18:59


 


Intake Total 480 340 


 


Output Total 1100 1000 900


 


Balance -620 -660 -900


 


Weight 57.4 kg 57 kg 


 


Intake:   


 


  Oral 480 340 


 


Output:   


 


  Urine 1100 1000 900


 


Other:   


 


  # Voids  1 














- Exam





CONSTITUTIONAL: Appears comfortable, cooperative, no acute distress


RESPIRATORY:  Lungs sounds diminished bilaterally.  Respirations even, 

nonlabored.  Currently on 2 L nasal cannula with oxygen saturation 100%.  Able 

to achieve 3000 mL on incentive spirometry.  Strong cough.  


CARDIOVASCULAR:  S1, S2 present.  Regular rate and rhythm, sinus rhythm on 

telemetry.  Palpable peripheral pulses bilaterally.  No edema present.  No calf 

pain or tenderness noted.  


GASTROINTESTINAL:  Abdomen soft, nontender, nondistended.  Active bowel sounds 

present 4 quadrants.  Tolerating diet. 


GENITOURINARY:  Continues to void 


INTEGUMENTARY:  Skin is warm and dry with evidence of good perfusion.  


NEUROLOGIC:  Cranial nerves II through XII intact


MUSKULOSKELETAL:  Able to move all extremities, strength equal bilaterally, gait

normal


PSYCHIATRIC:  Alert and oriented to person place and time, appropriate affect, 

intact judgment and insight


INVASIVE LINES AND TUBES:  Right-sided thoravent present, nonocclusive cap 

present, positive air leak with coughing as determined by fluctuation of the 

right diaphragm





- Labs


CBC & Chem 7: 


                                 09/05/21 18:38





                                 09/05/21 18:38





- Imaging and Cardiology


CT scan - chest: report reviewed, image reviewed





Assessment and Plan


Assessment: 





1.  Spontaneous right-sided pneumothorax, first occurrence, status post 

placement of a thoravent by emergency room physicians


2.  Shortness of breath, secondary to above


3.  Lactic acidosis present on admission, resolved


4.  Current long-term tobacco dependence


5.  COPD with emphysematous changes to the lungs present on CT


6.  Occasional marijuana use


7.  Family history of premature coronary artery disease


Plan: 





1.  Will leave thoravent in place with nonocclusive cap.  When no air leak pre

sent we will place occlusive cap 


2.  Wean O2 as tolerated.  Encourage incentive spirometry use 10 times every 

hour while awake


3.  Will monitor daily x-rays


4.  Increase activity, ambulate as tolerated


5.  Smoking cessation counseling reinforced


6.  Medical management for comorbidities per primary care service


7.  Patient may stay inpatient for management of thoravent.  If primary care 

wishes to discharge to patient at home he may be discharged with thoravent in 

place to follow-up with cardiothoracic surgery in the office and we will 

instruct the patient what to look for


8.  No surgical intervention warranted at this time.  We did discuss with the 

patient however that if he develops a second pneumothorax this phenomenon is 

likely to continue to recur and at that point we would likely offer surgery 

including stapling of blebs and mechanical pleurodesis.  The patient verbalized 

understanding


9.  More recommendations to follow





Time with Patient: Greater than 30

## 2021-09-07 NOTE — P.PN
Subjective





This is a pleasant 60 years old male with no significant past medical history.  

Presents because of shortness of breath for 10 days duration with no significant

cough but he is spitting white to yellow Twice today.  Right-sided chest pain 

when he came in but today's chest pain is improved.


His dyspnea was much worse yesterday so he decided to come to emergency room.


He denies Diarrhea or dysuria or fever.


No headache or weakness


He smokes about 12 cigarettes to 1 pack per day, he is counseled to quit and he 

agrees but he declined nicotine patch for now.  Or illicit drugs.


He does not have PCP.  He is not on Home oxygen at baseline





Vital signs stable, currently he saturated 100% on 2 L oxygen via nasal Cannula


Labs reviewed, CBC is unremarkable.


INR is normal at 1.0.  D-dimer is negative at 0.5.


Lactic acid elevated at 4.2 came back to normal at 1.5


Rest of BMP and liver enzymes are unremarkable.  ProBNP is 29.  And is negative 

less than 0.012.


Urine analysis is unremarkable.  Covid test is negative.


Chest x-ray showed a large right-sided pneumothorax without tension.  COPD.  

Repeat chest x-ray this morning showing increasing right lower lobe and left 

perihilar infiltrates


He is currently on normal saline at 100 mL per hour.  Management


Cardiothoracic surgery with lens been consulted to the emergency room 


repeat chest x-ray today showing worsened right lower lobe infiltrate








9/7/2021


Patient is with minimal dyspnea today, no chest pain.  Vital signs stable, no 

fever.  94% on room air.


Scan of the chest showing less than 5% pneumothorax despite chest tube, there is

no mention of consolidation however there is right chest atelectasis with severe

emphysema.  Because bleb


However her labs today show a leukocytosis of 13 K


Patient is still covered with Levaquin for possible pneumonia although 

clinically he does not behave with pneumonia like no fever, minimal symptoms and

improved with chest tube placement.  Await for procalcitonin


Pulmonary team consulted


Repeat chest x-ray tomorrow


Cleared tomorrow by pulmonary and cardiothoracic surgery we may consider him for

discharge and close outpatient follow-up 


patient is a previous smoker, he does not want to go back to smoking and he 

refused nicotine patch stated that he quit cold turkey now











Objective





- Vital Signs


Vital signs: 


                                   Vital Signs











Temp  97.6 F   09/07/21 11:19


 


Pulse  64   09/07/21 11:19


 


Resp  16   09/07/21 11:19


 


BP  117/64   09/07/21 11:19


 


Pulse Ox  93 L  09/07/21 11:19








                                 Intake & Output











 09/06/21 09/07/21 09/07/21





 18:59 06:59 18:59


 


Intake Total 480 340 


 


Output Total 1100 1000 900


 


Balance -769 -706 -900


 


Weight 57.4 kg 57 kg 


 


Intake:   


 


  Oral 480 340 


 


Output:   


 


  Urine 1100 1000 900


 


Other:   


 


  # Voids  1 














- Exam





GENERAL: The patient is alert and oriented x3, not in any acute distress. Well 

developed, well nourished. 


HEENT: Pupils are round and equally reacting to light. EOMI. No scleral icterus.

No conjunctival pallor. Normocephalic, atraumatic. No pharyngeal erythema. No 

thyromegaly. 


CARDIOVASCULAR: S1 and S2 present. No murmurs, rubs, or gallops. 


-PULMONARY: Chest is clear to auscultation, no wheezing or crackles.  Right 

thoravent is in place


ABDOMEN: Soft, nontender, nondistended, normoactive bowel sounds. No palpable 

organomegaly. 


MUSCULOSKELETAL: No joint swelling or deformity. 


EXTREMITIES: No cyanosis, clubbing, or pedal edema. 


NEUROLOGICAL: Gross neurological examination did not reveal any focal deficits. 


SKIN: No rashes. no petechiae.





- Labs


CBC & Chem 7: 


                                 09/07/21 08:22





                                 09/05/21 18:38


Labs: 


                  Abnormal Lab Results - Last 24 Hours (Table)











  09/07/21 Range/Units





  08:22 


 


WBC  13.3 H  (3.8-10.6)  k/uL


 


RBC  4.25 L  (4.30-5.90)  m/uL


 


MCV  102.4 H  (80.0-100.0)  fL


 


Neutrophils #  10.1 H  (1.3-7.7)  k/uL














Assessment and Plan


Assessment: 





Acute spontaneous  right pneumothorax, status post thoravent 


Possible Right lower lobe pneumonia versus atelectasis


Severe emphysema


Patient with elevated lactic acid came back to normal 














Plan: 





this is a pleasant 60 years old male who presents with a right pneumothorax


Cardiothoracic surgery consult. 


Pain management 


Continue with Levaquin and side basing on the probeCosta tone and


Pulmonary team consult


Labs were reviewed..  Continue same treatment.  Continue with symptomatic sajan

tment.  Resume home medication.  Monitor lytes and vitals.  DVT and GI 

prophylaxis.  Further recommendations depends on the clinical course of the 

patient


DVT prophylaxis: Subcutaneous heparin


GI Prophylaxis: Pepcid

## 2021-09-07 NOTE — CT
EXAMINATION TYPE: CT chest wo con

 

DATE OF EXAM: 9/7/2021

 

COMPARISON: X-ray from yesterday and up to 3 days earlier.

 

HISTORY: Pneumothorax, Blebs?; Abnormal x-rays.

;

CT DLP: 235 mGycm.  Automated Exposure Control for Dose Reduction was Utilized.

 

 

TECHNIQUE:  CT scan of the thorax is performed without IV contrast.

 

FINDINGS:

 

LUNGS: Advanced underlying emphysematous change is redemonstrated. There is tiny right pleural fluid 
collection or effusion with associated compressive atelectasis. Some dependent atelectasis along the 
fissures in the right lung are present. There is mild to moderate medial bibasilar linear scarring an
d/or atelectasis. There is anterior right-sided pleural drainage catheter with tiny residual right ap
ical pneumothorax estimated at less than 5% appreciated best on coronal image 45. There is large righ
t apical bulla noted just inferior to this. No mediastinal shift.

 

MEDIASTINUM: Lack of IV contrast is noted to limit evaluation for mediastinal and especially hilar ad
enopathy. There are no definitive greater than 1 cm hilar or mediastinal lymph nodes. Small pericardi
al effusion is seen anterior inferior aspect. Coronary artery calcification is present. No cardiomega
ly.

 

OTHER: Occasional calcifications scattered throughout the liver and spleen consistent with product of
 old granulomatous disease. S-shaped scoliosis.

 

IMPRESSION: Advanced emphysematous change bilaterally. Tiny right apical pneumothorax less than 5% ad
jacent to large right apical bulla despite right anterior chest tube.

## 2021-09-08 VITALS
TEMPERATURE: 98 F | SYSTOLIC BLOOD PRESSURE: 114 MMHG | RESPIRATION RATE: 18 BRPM | DIASTOLIC BLOOD PRESSURE: 73 MMHG | HEART RATE: 76 BPM

## 2021-09-08 LAB
ANION GAP SERPL CALC-SCNC: 5 MMOL/L
BUN SERPL-SCNC: 16 MG/DL (ref 9–20)
CALCIUM SPEC-MCNC: 9.3 MG/DL (ref 8.4–10.2)
CHLORIDE SERPL-SCNC: 104 MMOL/L (ref 98–107)
CO2 SERPL-SCNC: 29 MMOL/L (ref 22–30)
ERYTHROCYTE [DISTWIDTH] IN BLOOD BY AUTOMATED COUNT: 4.52 M/UL (ref 4.3–5.9)
ERYTHROCYTE [DISTWIDTH] IN BLOOD: 13.4 % (ref 11.5–15.5)
GLUCOSE SERPL-MCNC: 84 MG/DL (ref 74–99)
HCT VFR BLD AUTO: 45.6 % (ref 39–53)
HGB BLD-MCNC: 14.8 GM/DL (ref 13–17.5)
MCH RBC QN AUTO: 32.8 PG (ref 25–35)
MCHC RBC AUTO-ENTMCNC: 32.5 G/DL (ref 31–37)
MCV RBC AUTO: 101 FL (ref 80–100)
PLATELET # BLD AUTO: 247 K/UL (ref 150–450)
POTASSIUM SERPL-SCNC: 4.5 MMOL/L (ref 3.5–5.1)
SODIUM SERPL-SCNC: 138 MMOL/L (ref 137–145)
WBC # BLD AUTO: 7.7 K/UL (ref 3.8–10.6)

## 2021-09-08 RX ADMIN — FAMOTIDINE SCH MG: 10 INJECTION, SOLUTION INTRAVENOUS at 08:47

## 2021-09-08 RX ADMIN — KETOROLAC TROMETHAMINE PRN MG: 15 INJECTION, SOLUTION INTRAMUSCULAR; INTRAVENOUS at 12:03

## 2021-09-08 RX ADMIN — HEPARIN SODIUM SCH UNIT: 5000 INJECTION INTRAVENOUS; SUBCUTANEOUS at 08:47

## 2021-09-08 NOTE — P.PN
Subjective


Progress Note Date: 09/08/21


Principal diagnosis: 





Spontaneous right-sided pneumothorax, first occurrence, status post placement of

a thoravent by emergency room physicians, shortness of breath, lactic acidosis 

present on admission.  Medical history of current long-term tobacco dependence, 

COPD, occasional marijuana use, family history of premature coronary artery 

disease





The patient's currently sitting up in bed in no acute distress.  Denies pain or 

shortness of breath.  [facility] was 

completed demonstrating trivial 5% remaining apical pneumothorax on the right si

de, patient does have emphysematous changes to the lungs.  No air leak present 

this morning in thoravent.  Remains on 2 L nasal cannula although oxygen 

saturation has remained in the high 90s to 100%.  Patient has been ambulatory 

with assistance.  No other new concerns.





Objective





- Vital Signs


Vital signs: 


                                   Vital Signs











Temp  97.7 F   09/08/21 04:00


 


Pulse  59 L  09/08/21 04:00


 


Resp  18   09/08/21 04:00


 


BP  113/72   09/08/21 04:00


 


Pulse Ox  99   09/08/21 04:00








                                 Intake & Output











 09/07/21 09/08/21 09/08/21





 18:59 06:59 18:59


 


Intake Total 660  


 


Output Total 900 2250 


 


Balance -240 -2250 


 


Weight  58.2 kg 


 


Intake:   


 


  Oral 660  


 


Output:   


 


  Urine 900 2250 


 


Other:   


 


  # Voids 2 3 














- Exam





CONSTITUTIONAL: Appears comfortable, cooperative, no acute distress


RESPIRATORY:  Lungs sounds diminished bilaterally.  Respirations even, 

nonlabored.  Currently on 2 L nasal cannula with oxygen saturation 99%.  Able to

achieve 3000 mL on incentive spirometry.  Strong cough.  


CARDIOVASCULAR:  S1, S2 present.  Regular rate and rhythm, sinus rhythm on 

telemetry.  Palpable peripheral pulses bilaterally.  No edema present.  No calf 

pain or tenderness noted.  


GASTROINTESTINAL:  Abdomen soft, nontender, nondistended.  Active bowel sounds 

present 4 quadrants.  Tolerating diet. 


GENITOURINARY:  Continues to void 


INTEGUMENTARY:  Skin is warm and dry with evidence of good perfusion.  


NEUROLOGIC:  Cranial nerves II through XII intact


MUSKULOSKELETAL:  Able to move all extremities, strength equal bilaterally, gait

normal


PSYCHIATRIC:  Alert and oriented to person place and time, appropriate affect, 

intact judgment and insight


INVASIVE LINES AND TUBES:  Right-sided thoravent present, nonocclusive cap 

present, no air leak present this morning





- Allied health notes


Allied health notes reviewed: nursing





- Labs


CBC & Chem 7: 


                                 09/07/21 08:22





                                 09/05/21 18:38


Labs: 


                  Abnormal Lab Results - Last 24 Hours (Table)











  09/07/21 Range/Units





  08:22 


 


WBC  13.3 H  (3.8-10.6)  k/uL


 


RBC  4.25 L  (4.30-5.90)  m/uL


 


MCV  102.4 H  (80.0-100.0)  fL


 


Neutrophils #  10.1 H  (1.3-7.7)  k/uL














- Imaging and Cardiology


Chest x-ray: image reviewed





Assessment and Plan


Assessment: 





1.  Spontaneous right-sided pneumothorax, first occurrence, status post 

placement of a thoravent by emergency room physicians


2.  Shortness of breath, secondary to above


3.  Lactic acidosis present on admission, resolved


4.  Current long-term tobacco dependence


5.  COPD with emphysematous changes to the lungs present on CT


6.  Occasional marijuana use


7.  Family history of premature coronary artery disease


Plan: 





1.  Occlusive cap placed to thoravent.  Will repeat chest x-ray later this 

morning.  If no increase in pneumothorax likely will discontinue thoravent.  If 

thoravent needs to stay in place patient may be discharged to home from our 

standpoint with thoravent to follow up in the cardiothoracic surgery office for 

removal


2.  Oxygen removed, monitor pulse oximetry on room air.  Encourage incentive 

spirometry use 10 times every hour while awake


3.  Will monitor daily x-rays while hospitalized


4.  Increase activity, ambulate as tolerated


5.  Smoking cessation counseling reinforced


6.  Medical management for comorbidities per primary care service


7.  No surgical intervention warranted at this time.  We did discuss with the 

patient however that if he develops a second pneumothorax this phenomenon is 

likely to continue to recur and at that point we would likely offer surgery 

including stapling of blebs and mechanical pleurodesis.  The patient verbalized 

understanding


8.  More recommendations to follow





Time with Patient: Greater than 30

## 2021-09-08 NOTE — P.DS
Providers


Date of admission: 


09/05/21 19:57





Attending physician: 


Stevo Kendall MD





Consults: 





                                        





09/05/21 19:58


Consult Physician Routine 


   Consulting Provider: Sulaiman Perez


   Consult Reason/Comments: Pneumothorax status post Thoravent


   Do you want consulting provider notified?: Yes





09/06/21 14:59


Consult Physician Urgent 


   Consulting Provider: Filiberto Lind


   Consult Reason/Comments: pna


   Do you want consulting provider notified?: Yes











Primary care physician: 


Stated None





Hospital Course: 





Diagnoses:


Acute spontaneous  right pneumothorax, status post thoravent , which is removed 

prior to discharge with stable resolved right pneumothorax


Possible Right lower lobe atelectasis.  No evidence of pneumonia, no fever or 

leukocytosis.  Respiratory symptoms improved and he is on room air.  No need for

antibiotics upon discharge


Severe emphysema


Nicotine dependence, quit upon this admission as cold turkey, patient declines 

nicotine patch


Patient with elevated lactic acid came back to normal 





Hospital course:





This is a pleasant 60 years old male with no significant past medical history.  

Presents because of shortness of breath for 10 days duration with no significant

cough but he is spitting white to yellow Twice today.  Right-sided chest pain 


Chest x-ray showed a large right-sided pneumothorax without tension.  With COPD.

 thoravent was placed in the emergency room with resolution of the pneumothorax.

 Today his thoravent was removed under guidance of the thoracic surgery team.  

Repeat chest x-ray showing stable findings with no pneumothorax.  And hence 

patient was cleared for discharge by cardiothoracic surgery team and pulmonary 

service team.  On the day of discharge patient is back to his baseline.  He 

denies dyspnea.  He is on room air at rest and on exertion, he does not qualify 

for any home oxygen.  No other symptoms.  No chest pain or fever or coughing.  

Change in urine or bowel habits.


Patient smokes 12 cigarettes to 1 pack per day, he is counseled to quit and he 

agrees but he declined nicotine patch for now.  Or illicit drugs.


He does not have PCP.  He is not on Home oxygen at baseline.  And he does not 

have medical insurance.  Patient is advised [facility] 

upon discharge and he agrees


Patient was cleared for discharge by pulmonary and cardiothoracic surgery team


Patient agrees to go home today


Problems and management plan were discussed with the patient and he verbalized 

understanding and acceptance


Patient was found stable and can be discharged home however he needs follow-up 

as an outpatient. Patient was instructed to follow up with PCP within one week 

and patient agrees to follow-up with the [facility].


Patient was instructed to follow up with pulmonary and cardiothoracic surgery 

team and asked staff to make appointments for him and he agrees with the 

appointments made with a me from [facility] on 9/13 and Dr. Lind pulmonologist on 9/17





Physical exam


Gen: patient is a AAOx3, no distress


CVS: S1-S2, RRR, no murmur


Lungs: B/L CTA, no wheezing


Abdomen: soft, no distention, no tenderness, positive bowel sounds


Extremity: no leg edema or induration





Time spent more than 35 minutes





Patient Condition at Discharge: Stable





Plan - Discharge Summary


Discharge Rx Participant: No


New Discharge Prescriptions: 


No Action


   No Known Home Medications 


Discharge Medication List





No Known Home Medications  09/05/21 [History]








Follow up Appointment(s)/Referral(s): 


Jasmine Soto NPC [Nurse Practitioner] - 09/13/21 2:30 pm (Please obtain chest 

x-ray at the hospital prior to your appointment. Your appointment will be at the

surgeon's office behind the hospital at [facility], [place], operated [facility], [address] [place] [address], [address].  Phone number is [phone])


None,Stated [Primary Care Provider] - 1-2 days


[facility] [[facility] -  (Please call to schedule a follow 

up appointment with a primary physician of your choice. )


Filiberto Lind MD [STAFF PHYSICIAN] - 09/17/21 12:00 pm


Ambulatory/Diagnostic Orders: 


XR chest 2V [RAD.AMB] Time Frame: 09/13/21, Facility: [facility], 

Location: [facility]


Patient Instructions/Handouts:  Spontaneous Pneumothorax (DC), Chest Tubes (DC)


Activity/Diet/Wound Care/Special Instructions: 


Please leave dressing in place for 48 hours, after that you may remove dressing 

and shower daily.  Continue to use incentive spirometer.  NO SMOKING.  Any fever

 over 101F please call surgery office at [phone].  Please obtain chest x-

ray prior to follow up appointment in surgery office.





heart healthy diet 





activity is restricted till you see your doctor 


Discharge Disposition: HOME SELF-CARE

## 2021-09-08 NOTE — XR
EXAMINATION TYPE: XR chest 2V

 

DATE OF EXAM: 9/8/2021

 

COMPARISON: 9/8/2021 earlier exams

 

INDICATION: Postthoracentesis removal of prior pneumothorax

 

TECHNIQUE:  Frontal and lateral views of the chest are obtained.

 

FINDINGS:  

The heart size is normal.  

The pulmonary vasculature is normal.

Hyperinflation is present from COPD.

 

Patient's previous right pneumothorax.

 

IMPRESSION:  

1. No significant pneumothorax post thoracotomy and removal.

2. Persistent subcutaneous emphysema right upper chest wall.

3. COPD

## 2021-09-08 NOTE — P.PN
Subjective


Progress Note Date: 09/08/21


Principal diagnosis: 





Right-sided spontaneous pneumothorax





Acute on chronic hypoxic respiratory failure





End-stage severe COPD





Extensive bullous disease of the lung





Dehydration and intravascular volume depletion improved





Extensive history of smoking and nicotine use


09/08/2021, patient seen eval examined sitting upright on the bed breathing 

comfortably still on 2 L oxygen, denies any chest pain saturation remains stable

into mid 90s, able to ambulate and get up, white count is are stable, 

cardiothoracic surgery has put the Back on pleural removed earlier today, chest 

x-ray no pneumothorax seen, if patient remains stable can be discharged home 

with follow-up on outpatient basis





his is a 60-year-old male with extensive history of smoking and nicotine use and

smokes about one to 2 packs per day for about 30-40 years came into the hospital

with progressive increasing shortness of breath started about a week ago his 

symptoms got better with nebulization treatment however due to persistent 

shortness of breath came into the hospital for further evaluation, patient un

derwent a chest x-ray on admit significant large right-sided pneumothorax was 

seen without evidence of tension, forever and was placed in the emergency room 

by emergency room physician with good expansion of the right lung, computed 

tomography scan performed today shows extensive bilateral emphysematous changes 

with tiny effusion at the bases, some atelectasis seen, some scarring also 

noted, tiny residual right pneumothorax less than 5% noted, in addition at 

Jordin right-sided apical bulla was noted as well, patient shortness of breath 

has significantly improved, chest pain is better, able to ambulate currently on 

3 L oxygen, patient is doing incentive spirometry








Objective





- Vital Signs


Vital signs: 


                                   Vital Signs











Temp  98.0 F   09/08/21 15:00


 


Pulse  76   09/08/21 15:00


 


Resp  18   09/08/21 15:00


 


BP  114/73   09/08/21 15:00


 


Pulse Ox  96   09/08/21 15:00








                                 Intake & Output











 09/07/21 09/08/21 09/08/21





 18:59 06:59 18:59


 


Intake Total 660  420


 


Output Total 900 2250 400


 


Balance -240 -2250 20


 


Weight  58.2 kg 


 


Intake:   


 


  Oral 660  420


 


Output:   


 


  Urine 900 2250 400


 


Other:   


 


  Voiding Method   Urinal


 


  # Voids 2 3 














- Exam





- Constitutional


General appearance: average body habitus, cooperative, disheveled





- EENT


Eyes: PERRLA


Ears: bilateral: normal





- Neck


Carotids: bilateral: upstroke normal


Thyroid: bilateral: normal size





- Respiratory


Respiratory: bilateral: diminished





- Cardiovascular


Rhythm: regular


Heart sounds: normal: S1, S2





- Gastrointestinal


General gastrointestinal: soft





- Integumentary


Integumentary: normal turgor





- Neurologic


Neurologic: CNII-XII intact





- Musculoskeletal


Musculoskeletal: gait normal, generalized weakness, strength equal bilaterally





- Psychiatric


Psychiatric: A&O x's 3, appropriate affect, intact judgment & insight





- Labs


CBC & Chem 7: 


                                 09/08/21 07:33





                                 09/08/21 07:33


Labs: 


                  Abnormal Lab Results - Last 24 Hours (Table)











  09/08/21 Range/Units





  07:33 


 


MCV  101.0 H  (80.0-100.0)  fL














Assessment and Plan


Assessment: 





Right-sided spontaneous pneumothorax





Acute on chronic hypoxic respiratory failure





End-stage severe COPD





Extensive bullous disease of the lung





Dehydration and intravascular volume depletion improved





Extensive history of smoking and nicotine use


Plan: 





Continue deep breathing exercise incentive spirometry





Continue bronchodilator





Patient has been consult about smoking cessation





Further workup and evaluation of COPD as outpatient including alpha-1 

antitrypsin level





Status post removal of thoravent, no recurrence of pneumothorax on follow-up 

chest x-ray





Stable pulmonary standpoint for discharge





Follow clinical course closely further recommendations pending


Time with Patient: Greater than 30

## 2021-09-08 NOTE — XR
EXAMINATION TYPE: XR chest 2V

 

DATE OF EXAM: 9/8/2021

 

COMPARISON: 9/8/2021 earlier exam

 

INDICATION: Right pneumothorax

 

TECHNIQUE:  Frontal and lateral views of the chest are obtained.

 

FINDINGS:  

The heart size is normal.  

The pulmonary vasculature is normal.

Hyperinflation is present compatible with COPD. There is an increased AP diameter. There is minimal e
ffusion at the right costophrenic angle. Small effusion may be present at the left costophrenic angle
.

 

Previous residual pneumothorax appears resolved over the interval. Subcutaneous emphysema is within t
he upper outer right axillary region. Chest tube remains on the right.

 

IMPRESSION:  

1. Resolution previous right apical pneumothorax.

2. Very minimal effusions may be within the costophrenic angles.

3. COPD

## 2021-09-08 NOTE — XR
EXAMINATION TYPE: XR chest 2V

 

DATE OF EXAM: 9/8/2021

 

COMPARISON: 9/6/2021

 

INDICATION: Pneumothorax

 

TECHNIQUE:  Frontal and lateral views of the chest are obtained.

 

FINDINGS:  

The heart size is normal.  

The pulmonary vasculature is normal.

There is hyperinflation compatible COPD. The minimal peripheral pneumothorax is identified. The chest
 tube remains in position. There is some increasing subcutaneous emphysema..

 

IMPRESSION:  

1. Minimal residual right upper outer lung field pneumothorax. There is some mild increase of subcuta
neous emphysema in this region.

2. COPD

## 2021-09-13 NOTE — CONS
CONSULTATION



DATE OF SERVICE:  9/6/21



I have seen, examined, and agree with the midlevel's findings.





MMODL / IJN: [id number] / Job#: [id number]